# Patient Record
Sex: FEMALE | URBAN - METROPOLITAN AREA
[De-identification: names, ages, dates, MRNs, and addresses within clinical notes are randomized per-mention and may not be internally consistent; named-entity substitution may affect disease eponyms.]

---

## 2018-04-09 ENCOUNTER — HOSPITAL ENCOUNTER (OUTPATIENT)
Dept: LAB | Age: 53
Discharge: HOME | End: 2018-04-09

## 2018-04-09 ENCOUNTER — DOCUMENTATION ONLY (OUTPATIENT)
Dept: SURGERY | Age: 53
End: 2018-04-09

## 2018-04-09 ENCOUNTER — OFFICE VISIT (OUTPATIENT)
Dept: SURGERY | Age: 53
End: 2018-04-09

## 2018-04-09 VITALS
BODY MASS INDEX: 23.66 KG/M2 | HEART RATE: 83 BPM | SYSTOLIC BLOOD PRESSURE: 115 MMHG | DIASTOLIC BLOOD PRESSURE: 62 MMHG | HEIGHT: 65 IN | WEIGHT: 142 LBS

## 2018-04-09 DIAGNOSIS — R92.8 ABNORMAL ULTRASOUND OF BREAST: ICD-10-CM

## 2018-04-09 DIAGNOSIS — N63.10 BREAST MASS, RIGHT: Primary | ICD-10-CM

## 2018-04-09 RX ORDER — PHENTERMINE HYDROCHLORIDE 15 MG/1
CAPSULE ORAL
Refills: 0 | COMMUNITY
Start: 2018-03-20

## 2018-04-09 RX ORDER — LINACLOTIDE 72 UG/1
CAPSULE, GELATIN COATED ORAL
Refills: 0 | COMMUNITY
Start: 2018-03-20

## 2018-04-09 NOTE — PROGRESS NOTES
HISTORY OF PRESENT ILLNESS  Ebony Martines is a 50 y.o. female. HPI   NEW patient presents for consultation at the request of Dr. Glory Mckenna for palpable RIGHT breast lump that she felt about Thanksgiving of last year. Says that the lump waxes and wanes in size and is painful to touch and palpation. Has pain from this lump that radiates up into the axilla. Can sometimes see the lump when she raises her right arm up. Does have a history of cysts. Has no nipple discharge/retraction or skin change. Has never had a breast biopsy in the past, however she has had a few calls backs with her mammogram.    FH is significant for a paternal grandmother who had breast cancer in her 62s. Recent imaging has been at [address] [place] [address], BIRADS 4a suspicious with an oval solid mass in the RIGHT breast at 3 o'clock and a 3 cm oval simple cyst in the RIGHT breast at 11 o'clock. Biopsy is recommended. No past medical history on file. Past Surgical History:   Procedure Laterality Date    HX GYN      For uterine polyps       Social History     Social History    Marital status: UNKNOWN     Spouse name: N/A    Number of children: N/A    Years of education: N/A     Occupational History    Not on file. Social History Main Topics    Smoking status: Never Smoker    Smokeless tobacco: Never Used    Alcohol use Yes    Drug use: No    Sexual activity: Not on file     Other Topics Concern    Not on file     Social History Narrative    No narrative on file       No current outpatient prescriptions on file prior to visit. No current facility-administered medications on file prior to visit. Allergies   Allergen Reactions    Sulfa (Sulfonamide Antibiotics) Hives         ROS   Constitutional: Negative. HENT: Negative. Eyes: Negative. Respiratory: Negative. Cardiovascular: Negative. Gastrointestinal: Negative. Genitourinary: Negative. Musculoskeletal: Negative. Skin: Negative. Neurological: Negative. Endo/Heme/Allergies: Negative. Psychiatric/Behavioral: Negative.       Physical Exam   Cardiovascular: Normal rate, regular rhythm and normal heart sounds. Pulmonary/Chest: Breath sounds normal. Right breast exhibits no inverted nipple, no mass, no nipple discharge, no skin change and no tenderness. Left breast exhibits no inverted nipple, no mass, no nipple discharge, no skin change and no tenderness. Breasts are symmetrical.       Lymphadenopathy:     She has no cervical adenopathy. Right cervical: No superficial cervical, no deep cervical and no posterior cervical adenopathy present. Left cervical: No superficial cervical, no deep cervical and no posterior cervical adenopathy present. She has no axillary adenopathy. Right axillary: No pectoral and no lateral adenopathy present. Left axillary: No pectoral and no lateral adenopathy present. ASPIRATION OF BREAST CYST  Indication : CYST:  right  upper outer quadrant  Ultrasound Findings: Cyst  Prep : Alcohol. Anesthesia : Lidocaine with epinephrine, 8cc  Guidance : Ultrasound guidance. Yield :  3cc clear fluid was aspirated with an 18 gauge needle. Effect : Cyst completely aspirated. Tolerance: Pt tolerated the procedure without discomfort  Diposition:  Follow up if new mass occurs    US - Guided Core Biopsy  Following detailed explanation and  description of the Biopsy procedure, its risk, benefits and possible alternatives, the patient signed the informed consent. Indication : Mass, Ultrasound Visible, right Breast 3:00  Prep : We cleansed the skin with alcohol. Anesthesia : We anesthetized the skin and underlying tissues with 1% lidocaine with epinephrine. Device : We advanced the Bard Marquee device through the lesion and captured tissue with real-time Ultrasound Confirmation. Core Sampling : We repeated this sampling for the following number of cores, 4. Marker :  We placed a marking clip to garrison the biopsy site. Marker Type : SENOMARK. Dressing : We then closed the incision with steristrips and placed a sterile dressing. Instructions : The patient was instructed regarding post-procedure care and activities. Pathology : Pending at this time. Patient tolerated procedure well and discharged in stable condition. Informed patient that they will be notified of pathology results in 3 to 5 days. ASSESSMENT and PLAN    ICD-10-CM ICD-9-CM    1. Breast mass, right N63.10 611.72       Pt notes bothersome cyst at RIGHT breast. Discussed US-guided biopsy of solid lesion at [facility] and aspiration of RIGHT breast cyst upper outer, and pt elected to proceed. Aspiration yielded 3cc of fluid at UOQ, and 4 bx cores were taken at 3:00. We will f/u with her once results are in. This plan was reviewed with the patient and patient agrees. All questions were answered.     Written by Meaghan Lainez, as dictated by Dr. Kadi Gonzalez MD.

## 2018-04-09 NOTE — LETTER
4/9/2018 12:02 PM 
 
Patient:  Marisel Peacock YOB: 1965 Date of Visit: 4/9/2018 Dear Dr. Shwetha Bobby: 
 
 
Thank you for referring Ms. Ricarda Bauer to me for evaluation/treatment. Below are the relevant portions of my assessment and plan of care. HISTORY OF PRESENT ILLNESS Marisel Peacock is a 50 y.o. female. HPI  
NEW patient presents for consultation at the request of Dr. Antoine Queen for palpable breast lump that she felt about Thanksgiving of last year. Says that the lump waxes and wanes in size and is painful to touch and palpation. Has pain from this lump that radiates up into the axilla. Can sometimes see the lump when she raises her right arm up. Does have a history of cysts. Has no nipple discharge/retraction or skin change. Has never had a breast biopsy in the past, however she has had a few calls backs with her mammogram.   
FH is significant for a paternal grandmother who had breast cancer in her 62s. Recent imaging has been at [address] [place] [address], BIRADS 4a suspicious with an oval solid mass in the RIGHT breast at 3 o'clock and a 3 cm oval simple cyst in the RIGHT breast at 11 o'clock. Biopsy is recommended. No past medical history on file. Past Surgical History:  
Procedure Laterality Date  HX GYN For uterine polyps Social History Social History  Marital status: UNKNOWN Spouse name: N/A  
 Number of children: N/A  
 Years of education: N/A Occupational History  Not on file. Social History Main Topics  Smoking status: Never Smoker  Smokeless tobacco: Never Used  Alcohol use Yes  Drug use: No  
 Sexual activity: Not on file Other Topics Concern  Not on file Social History Narrative  No narrative on file No current outpatient prescriptions on file prior to visit. No current facility-administered medications on file prior to visit. Allergies Allergen Reactions  Sulfa (Sulfonamide Antibiotics) Hives ROS Constitutional: Negative. HENT: Negative. Eyes: Negative. Respiratory: Negative. Cardiovascular: Negative. Gastrointestinal: Negative. Genitourinary: Negative. Musculoskeletal: Negative. Skin: Negative. Neurological: Negative. Endo/Heme/Allergies: Negative. Psychiatric/Behavioral: Negative.   
  
Physical Exam  
Cardiovascular: Normal rate, regular rhythm and normal heart sounds. Pulmonary/Chest: Breath sounds normal. Right breast exhibits no inverted nipple, no mass, no nipple discharge, no skin change and no tenderness. Left breast exhibits no inverted nipple, no mass, no nipple discharge, no skin change and no tenderness. Breasts are symmetrical.  
 
 
Lymphadenopathy:  
  She has no cervical adenopathy. Right cervical: No superficial cervical, no deep cervical and no posterior cervical adenopathy present. Left cervical: No superficial cervical, no deep cervical and no posterior cervical adenopathy present. She has no axillary adenopathy. Right axillary: No pectoral and no lateral adenopathy present. Left axillary: No pectoral and no lateral adenopathy present. ASPIRATION OF BREAST CYST Indication : CYST:  right  upper outer quadrant Ultrasound Findings: Cyst 
Prep : Alcohol. Anesthesia : Lidocaine with epinephrine, 8cc Guidance : Ultrasound guidance. Yield :  3cc clear fluid was aspirated with an 18 gauge needle. Effect : Cyst completely aspirated. Tolerance: Pt tolerated the procedure without discomfort Diposition:  Follow up if new mass occurs US - Guided Core Biopsy Following detailed explanation and  description of the Biopsy procedure, its risk, benefits and possible alternatives, the patient signed the informed consent. Indication : Mass, Ultrasound Visible, right Breast 3:00 Prep : We cleansed the skin with alcohol. Anesthesia : We anesthetized the skin and underlying tissues with 1% lidocaine with epinephrine. Device : We advanced the Bard Marquee device through the lesion and captured tissue with real-time Ultrasound Confirmation. Core Sampling : We repeated this sampling for the following number of cores, 4. Marker : We placed a marking clip to garrison the biopsy site. Marker Type : SENOMARK. Dressing : We then closed the incision with steristrips and placed a sterile dressing. Instructions : The patient was instructed regarding post-procedure care and activities. Pathology : Pending at this time. Patient tolerated procedure well and discharged in stable condition. Informed patient that they will be notified of pathology results in 3 to 5 days. ASSESSMENT and PLAN 
  ICD-10-CM ICD-9-CM 1. Breast mass, right N63.10 611.72 Pt notes bothersome cyst at RIGHT breast. Discussed US-guided biopsy of solid lesion at [facility] and aspiration of RIGHT breast cyst upper outer, and pt elected to proceed. Aspiration yielded 3cc of fluid at UOQ, and 4 bx cores were taken at 3:00. We will f/u with her once results are in. This plan was reviewed with the patient and patient agrees. All questions were answered. Written by Cordelia Escobar, as dictated by Dr. Amanda Jimenez MD.  
 
 
If you have questions, please do not hesitate to call me. I look forward to following Ms. Ammon Castaneda along with you.  
 
 
 
Sincerely, 
 
 
Jimmy Rodríguez MD

## 2018-04-09 NOTE — PROGRESS NOTES
[facility]  OFFICE PROCEDURE PROGRESS NOTE        Chart reviewed for the following:   Kaylin Shaw MD, have reviewed the History, Physical and updated the Allergic reactions for Ebony Sepulveda     TIME OUT performed immediately prior to start of procedure:   Kaylin Shaw MD, have performed the following [facility] prior to the start of the procedure:            * Patient was identified by name and date of birth   * Agreement on procedure being performed was verified  * Risks and Benefits explained to the patient  * Procedure site verified and marked as necessary  * Patient was positioned for comfort  * Consent was signed and verified     Time:   10:47 am      Date of procedure: 4/9/2018    Procedure performed by:  Dian Blanco MD    Provider assisted by:   Vidal Sears RN    Patient assisted by: self    How tolerated by patient: tolerated the procedure well with no complications    Post Procedural Pain Scale: 0 - No Hurt    Comments:   Written and verbal post biopsy instructions reviewed with and given to patient with her understanding.

## 2018-04-09 NOTE — PROGRESS NOTES
HISTORY OF PRESENT ILLNESS  Ebony Ortega is a 50 y.o. female. HPI    NEW patient presents for consultation at the request of Dr. Joseph Reece for palpable breast lump that she felt about Thanksgiving of last year. Says that the lump waxes and wanes in size and is painful to touch and palpation. Has pain from this lump that radiates up into the axilla. Can sometimes see the lump when she raises her right arm up. Does have a history of cysts. Has no nipple discharge/retraction or skin change. Has never had a breast biopsy in the past, however she has had a few calls backs with her mammogram.    FH is significant for a paternal grandmother who had breast cancer in her 62s. Recent imaging has been at [address] [place] [address], BIRADS 4a suspicious with an oval solid mass in the RIGHT breast at 3 o'clock and a 3 cm oval simple cyst in the RIGHT breast at 11 o'clock. Biopsy is recommended. Review of Systems   Constitutional: Negative. HENT: Negative. Eyes: Negative. Respiratory: Negative. Cardiovascular: Negative. Gastrointestinal: Negative. Genitourinary: Negative. Musculoskeletal: Negative. Skin: Negative. Neurological: Negative. Endo/Heme/Allergies: Negative. Psychiatric/Behavioral: Negative.         Physical Exam    ASSESSMENT and PLAN  {ASSESSMENT/PLAN:[id number]}

## 2018-04-10 ENCOUNTER — DOCUMENTATION ONLY (OUTPATIENT)
Dept: SURGERY | Age: 53
End: 2018-04-10

## 2018-04-10 NOTE — PROGRESS NOTES
Type of Film: [] CD [x] FILMS  Type of Test: [] MRI [x] MAMMO  From: [facility]  Given to: Placed in shredder box  To be Downloaded into PACS:  NO

## 2018-04-11 ENCOUNTER — TELEPHONE (OUTPATIENT)
Dept: SURGERY | Age: 53
End: 2018-04-11

## 2018-09-06 ENCOUNTER — DOCUMENTATION ONLY (OUTPATIENT)
Dept: SURGERY | Age: 53
End: 2018-09-06

## 2018-09-07 ENCOUNTER — DOCUMENTATION ONLY (OUTPATIENT)
Dept: SURGERY | Age: 53
End: 2018-09-07

## 2018-09-07 NOTE — PROGRESS NOTES
Received a fax from [address] [place] [address] (Dr. Fransisca Moore) requesting the Pathology Report again because they didn't receive a complete copy electronically. I faxed it manually to the office @ [phone].